# Patient Record
(demographics unavailable — no encounter records)

---

## 2024-10-21 NOTE — ASSESSMENT
[FreeTextEntry1] : 22 yo male with chronic pelvic pain, scrotal pain, and bladder pressure.  These symptoms are most consistent with PFD.  I recommend that he consider PFPT for evaluation and treatment.  I also suggested that he try using heat (i.e. Sitz baths and heating pads) as well as NSAIDs PRN for symptomatic relief.   We also discussed trying to avoid trigger foods such as caffeine, spicy foods, and acidic foods.  He agrees with this plan.

## 2024-10-21 NOTE — HISTORY OF PRESENT ILLNESS
[FreeTextEntry1] : 22 yo male with 1 year of pelvis pain, bladder pressure, and scrotal ache.  He has seen another urologist who ordered a scrotal US and MRI of the prostate.  Both if these studies were normal, however, the MR did show some findings consistent with prostatitis.  He denies any urinary symptoms.  he denies hematuria, dysuria, urgency, or frequency.  His urine studies have been normal.